# Patient Record
(demographics unavailable — no encounter records)

---

## 2024-01-12 DIAGNOSIS — E11.9 TYPE 2 DIABETES MELLITUS WITHOUT COMPLICATION, WITHOUT LONG-TERM CURRENT USE OF INSULIN (MULTI): Primary | ICD-10-CM

## 2024-01-12 DIAGNOSIS — I10 PRIMARY HYPERTENSION: ICD-10-CM

## 2024-01-12 RX ORDER — EMPAGLIFLOZIN 25 MG/1
25 TABLET, FILM COATED ORAL
Qty: 90 TABLET | Refills: 0 | Status: SHIPPED | OUTPATIENT
Start: 2024-01-12

## 2024-01-12 RX ORDER — AMLODIPINE BESYLATE 2.5 MG/1
2.5 TABLET ORAL DAILY
Qty: 90 TABLET | Refills: 0 | Status: SHIPPED | OUTPATIENT
Start: 2024-01-12 | End: 2024-02-14

## 2024-01-12 RX ORDER — AMLODIPINE BESYLATE 2.5 MG/1
2.5 TABLET ORAL DAILY
COMMUNITY
End: 2024-01-12 | Stop reason: SDUPTHER

## 2024-01-12 RX ORDER — EMPAGLIFLOZIN 25 MG/1
25 TABLET, FILM COATED ORAL
COMMUNITY
End: 2024-01-12 | Stop reason: SDUPTHER

## 2024-01-12 NOTE — TELEPHONE ENCOUNTER
Patient is on treatment for hypertension, type 2 diabetes mellitus, hyperlipidemia.  Last hemoglobin A1c lab work was May 2023.  Patient previously followed by myself but also currently followed by endocrinology, Dr. Coker  With diabetes, he should be having lab work every 3 to 6 months and would have been due for lab work at least by last October.    I can only fill both prescriptions for 90 days only.  I recommend patient schedule an appointment with his [facility] endocrinologist or try to move up appointment with new [facility] PCP     Macario Patton MD

## 2024-01-12 NOTE — TELEPHONE ENCOUNTER
Patient requesting refills on amlodipine, jardiance.  He doesn't see Dr. Bain until 8-2024.  Please advise.  Thanks

## 2024-05-06 DIAGNOSIS — I10 PRIMARY HYPERTENSION: ICD-10-CM

## 2024-05-06 RX ORDER — AMLODIPINE BESYLATE 2.5 MG/1
2.5 TABLET ORAL DAILY
Qty: 90 TABLET | Refills: 1 | Status: SHIPPED | OUTPATIENT
Start: 2024-05-06